# Patient Record
Sex: MALE | Race: WHITE
[De-identification: names, ages, dates, MRNs, and addresses within clinical notes are randomized per-mention and may not be internally consistent; named-entity substitution may affect disease eponyms.]

---

## 2017-08-24 ENCOUNTER — HOSPITAL ENCOUNTER (OUTPATIENT)
Dept: HOSPITAL 10 - SDS | Age: 32
Discharge: HOME | End: 2017-08-24
Attending: SURGERY
Payer: COMMERCIAL

## 2017-08-24 VITALS — DIASTOLIC BLOOD PRESSURE: 47 MMHG | HEART RATE: 62 BPM | SYSTOLIC BLOOD PRESSURE: 85 MMHG | RESPIRATION RATE: 13 BRPM

## 2017-08-24 VITALS — RESPIRATION RATE: 13 BRPM | SYSTOLIC BLOOD PRESSURE: 111 MMHG | HEART RATE: 62 BPM | DIASTOLIC BLOOD PRESSURE: 63 MMHG

## 2017-08-24 VITALS — RESPIRATION RATE: 16 BRPM | HEART RATE: 64 BPM | DIASTOLIC BLOOD PRESSURE: 62 MMHG | SYSTOLIC BLOOD PRESSURE: 116 MMHG

## 2017-08-24 VITALS — SYSTOLIC BLOOD PRESSURE: 109 MMHG | HEART RATE: 66 BPM | DIASTOLIC BLOOD PRESSURE: 60 MMHG | RESPIRATION RATE: 12 BRPM

## 2017-08-24 VITALS — HEART RATE: 64 BPM | RESPIRATION RATE: 13 BRPM | SYSTOLIC BLOOD PRESSURE: 101 MMHG | DIASTOLIC BLOOD PRESSURE: 47 MMHG

## 2017-08-24 VITALS — HEART RATE: 64 BPM | DIASTOLIC BLOOD PRESSURE: 54 MMHG | RESPIRATION RATE: 13 BRPM | SYSTOLIC BLOOD PRESSURE: 101 MMHG

## 2017-08-24 VITALS — HEART RATE: 78 BPM | DIASTOLIC BLOOD PRESSURE: 57 MMHG | SYSTOLIC BLOOD PRESSURE: 112 MMHG | RESPIRATION RATE: 11 BRPM

## 2017-08-24 VITALS — DIASTOLIC BLOOD PRESSURE: 63 MMHG | SYSTOLIC BLOOD PRESSURE: 120 MMHG | RESPIRATION RATE: 14 BRPM | HEART RATE: 62 BPM

## 2017-08-24 VITALS — RESPIRATION RATE: 14 BRPM | DIASTOLIC BLOOD PRESSURE: 52 MMHG | SYSTOLIC BLOOD PRESSURE: 107 MMHG | HEART RATE: 62 BPM

## 2017-08-24 VITALS — DIASTOLIC BLOOD PRESSURE: 64 MMHG | HEART RATE: 62 BPM | RESPIRATION RATE: 13 BRPM | SYSTOLIC BLOOD PRESSURE: 113 MMHG

## 2017-08-24 VITALS — DIASTOLIC BLOOD PRESSURE: 73 MMHG | RESPIRATION RATE: 14 BRPM | SYSTOLIC BLOOD PRESSURE: 106 MMHG | HEART RATE: 66 BPM

## 2017-08-24 VITALS — RESPIRATION RATE: 13 BRPM | DIASTOLIC BLOOD PRESSURE: 52 MMHG | HEART RATE: 64 BPM | SYSTOLIC BLOOD PRESSURE: 100 MMHG

## 2017-08-24 VITALS — HEART RATE: 66 BPM | SYSTOLIC BLOOD PRESSURE: 103 MMHG | RESPIRATION RATE: 13 BRPM | DIASTOLIC BLOOD PRESSURE: 61 MMHG

## 2017-08-24 VITALS — DIASTOLIC BLOOD PRESSURE: 61 MMHG | HEART RATE: 62 BPM | RESPIRATION RATE: 13 BRPM | SYSTOLIC BLOOD PRESSURE: 119 MMHG

## 2017-08-24 VITALS
HEIGHT: 69 IN | WEIGHT: 152.12 LBS | BODY MASS INDEX: 22.53 KG/M2 | HEIGHT: 69 IN | WEIGHT: 152.12 LBS | BODY MASS INDEX: 22.53 KG/M2

## 2017-08-24 VITALS — DIASTOLIC BLOOD PRESSURE: 55 MMHG | RESPIRATION RATE: 13 BRPM | HEART RATE: 64 BPM | SYSTOLIC BLOOD PRESSURE: 101 MMHG

## 2017-08-24 VITALS — HEART RATE: 64 BPM | RESPIRATION RATE: 16 BRPM | SYSTOLIC BLOOD PRESSURE: 112 MMHG | DIASTOLIC BLOOD PRESSURE: 66 MMHG

## 2017-08-24 VITALS — DIASTOLIC BLOOD PRESSURE: 50 MMHG | RESPIRATION RATE: 13 BRPM | HEART RATE: 64 BPM | SYSTOLIC BLOOD PRESSURE: 87 MMHG

## 2017-08-24 VITALS — SYSTOLIC BLOOD PRESSURE: 109 MMHG | DIASTOLIC BLOOD PRESSURE: 60 MMHG | HEART RATE: 67 BPM | RESPIRATION RATE: 20 BRPM

## 2017-08-24 VITALS — HEART RATE: 72 BPM | DIASTOLIC BLOOD PRESSURE: 54 MMHG | RESPIRATION RATE: 13 BRPM | SYSTOLIC BLOOD PRESSURE: 99 MMHG

## 2017-08-24 VITALS — HEART RATE: 66 BPM | RESPIRATION RATE: 13 BRPM | DIASTOLIC BLOOD PRESSURE: 55 MMHG | SYSTOLIC BLOOD PRESSURE: 109 MMHG

## 2017-08-24 DIAGNOSIS — D23.4: Primary | ICD-10-CM

## 2017-08-24 PROCEDURE — 88307 TISSUE EXAM BY PATHOLOGIST: CPT

## 2017-08-24 PROCEDURE — 14020 TIS TRNFR S/A/L 10 SQ CM/<: CPT

## 2017-08-24 NOTE — OPR
Date/Time of Note


Date/Time of Note


DATE: 8/24/17 


TIME: 13:51





Operative Report


Procedure Date:  Aug 24, 2017


Preoperative Diagnosis


scalp mass


Postoperative Diagnosis


same


Operation Performed


1. scalp mass excision 2 cm mass 2 cm incision


2. localized adjacent tissue transfer with the use of skin flaps 4 sq cm defect


3. therapeutic injection of subcutaneous marcaine cpt code 73125


Surgeon:  PATRICIA VAUGHN


Anesthesia Type:  general


Estimated Blood Loss:  0 - 10 ml's


Specimens


scalp mass


Grafts/Implants:  none


Complications:  no


Indications


This is a 32 year old male with a scalp mass.  He requests surgical excision.  

Risks, alternatives, benefits, personnel were discussed with the patient.  

Patient expressed understanding and consents to the operation.


Procedure Description


Patient is taken to the or and prepped and draped in the usual sterile fashion.

  Surgical timeout is performed.  IV antibiotics are given.  Elliptical 

incision is made surrounding the scalp mass.  Dissection cauterie is used to 

excise the mass leaving a 2 x 2 cm defect.  Localized adjacent tissue transfer 

with the use of skin flaps is used to close the wound.  Multilayer closure with 

3-0 vicryl and 4-0 monocryl is performed.  Therapeutic subcutaneous marcaine is 

injection throughout the incision.  antibiotic ointment is applied.











PATRICIA VAUGHN Aug 24, 2017 13:55

## 2018-08-09 ENCOUNTER — HOSPITAL ENCOUNTER (EMERGENCY)
Age: 33
LOS: 1 days | Discharge: LEFT BEFORE DISCHARGE | End: 2018-08-10

## 2018-08-09 ENCOUNTER — HOSPITAL ENCOUNTER (EMERGENCY)
Dept: HOSPITAL 91 - FTE | Age: 33
LOS: 1 days | Discharge: LEFT BEFORE DISCHARGE | End: 2018-08-10
Payer: COMMERCIAL

## 2018-08-09 DIAGNOSIS — Z53.21: Primary | ICD-10-CM

## 2018-08-11 ENCOUNTER — HOSPITAL ENCOUNTER (EMERGENCY)
Age: 33
Discharge: HOME | End: 2018-08-11

## 2018-08-11 ENCOUNTER — HOSPITAL ENCOUNTER (EMERGENCY)
Dept: HOSPITAL 91 - FTE | Age: 33
Discharge: HOME | End: 2018-08-11
Payer: COMMERCIAL

## 2018-08-11 DIAGNOSIS — T36.0X5A: ICD-10-CM

## 2018-08-11 DIAGNOSIS — R19.7: Primary | ICD-10-CM

## 2018-08-11 LAB
ABNORMAL IP MESSAGE: 1
ADD MAN DIFF?: YES
ADD UMIC: YES
ALANINE AMINOTRANSFERASE: 77 IU/L (ref 13–69)
ALBUMIN/GLOBULIN RATIO: 1.2
ALBUMIN: 3.6 G/DL (ref 3.3–4.9)
ALKALINE PHOSPHATASE: 56 IU/L (ref 42–121)
ANION GAP: 15 (ref 8–16)
ASPARTATE AMINO TRANSFERASE: 104 IU/L (ref 15–46)
BAND NEUTROPHILS #M: 0.8 10^3/UL (ref 0–0.6)
BAND NEUTROPHILS % (M): 31 % (ref 0–4)
BILIRUBIN,DIRECT: 0 MG/DL (ref 0–0.2)
BILIRUBIN,TOTAL: 0.1 MG/DL (ref 0.2–1.3)
BLOOD UREA NITROGEN: 16 MG/DL (ref 7–20)
CALCIUM: 8.3 MG/DL (ref 8.4–10.2)
CARBON DIOXIDE: 28 MMOL/L (ref 21–31)
CHLORIDE: 94 MMOL/L (ref 97–110)
CREATININE: 0.86 MG/DL (ref 0.61–1.24)
GIANT THROMBO% (M): 1 % (ref 0–0)
GLOBULIN: 3 G/DL (ref 1.3–3.2)
GLUCOSE: 126 MG/DL (ref 70–220)
HEMATOCRIT: 40 % (ref 42–52)
HEMOGLOBIN: 14.4 G/DL (ref 14–18)
IMMATURE GRANS #M: 0.02 10^3/UL
IMMATURE GRANS % (M): 0.8 %
LIPASE: 311 U/L (ref 23–300)
LYMPHOCYTES #M: 0.9 10^3/UL (ref 0.8–2.9)
LYMPHOCYTES % (M): 35 % (ref 15–51)
MEAN CORPUSCULAR HEMOGLOBIN: 31.7 PG (ref 29–33)
MEAN CORPUSCULAR HGB CONC: 36 G/DL (ref 32–37)
MEAN CORPUSCULAR VOLUME: 88.1 FL (ref 82–101)
MEAN PLATELET VOLUME: 10.3 FL (ref 7.4–10.4)
MONOCYTE #M: 0.1 10^3/UL (ref 0.3–0.9)
MONOCYTES % (M): 4 % (ref 0–11)
NUCLEATED RED BLOOD CELLS%: 0 /100WBC (ref 0–0)
PLATELET COUNT: 77 10^3/UL (ref 140–415)
PLATELET ESTIMATE: (no result)
POSITIVE DIFF: (no result)
POTASSIUM: 3.5 MMOL/L (ref 3.5–5.1)
REACTIVE LYMPHOCYTES #M: 0 10^3/UL (ref 0–0)
REACTIVE LYMPHOCYTES% (M): 2 % (ref 0–0)
RED BLOOD COUNT: 4.54 10^6/UL (ref 4.7–6.1)
RED CELL DISTRIBUTION WIDTH: 12.9 % (ref 11.5–14.5)
SEG NEUT #M: 0.7 10^3/UL (ref 1.6–7.5)
SEGMENTED NEUTROPHILS (M) %: 28 % (ref 39–77)
SMUDGE%M: 16 % (ref 0–0)
SODIUM: 133 MMOL/L (ref 135–144)
TOTAL PROTEIN: 6.6 G/DL (ref 6.1–8.1)
UR ASCORBIC ACID: 20 MG/DL
UR BILIRUBIN (DIP): NEGATIVE MG/DL
UR BLOOD (DIP): NEGATIVE MG/DL
UR CLARITY: CLEAR
UR COLOR: YELLOW
UR GLUCOSE (DIP): NEGATIVE MG/DL
UR KETONES (DIP): (no result) MG/DL
UR LEUKOCYTE ESTERASE (DIP): NEGATIVE LEU/UL
UR MUCUS: (no result) /HPF
UR NITRITE (DIP): NEGATIVE MG/DL
UR PH (DIP): 6 (ref 5–9)
UR RBC: 1 /HPF (ref 0–5)
UR SPECIFIC GRAVITY (DIP): 1.02 (ref 1–1.03)
UR TOTAL PROTEIN (DIP): (no result) MG/DL
UR UROBILINOGEN (DIP): (no result) MG/DL
UR WBC: 12 /HPF (ref 0–5)
WHITE BLOOD COUNT: 2.6 10^3/UL (ref 4.8–10.8)

## 2018-08-11 PROCEDURE — 96361 HYDRATE IV INFUSION ADD-ON: CPT

## 2018-08-11 PROCEDURE — 81001 URINALYSIS AUTO W/SCOPE: CPT

## 2018-08-11 PROCEDURE — 80053 COMPREHEN METABOLIC PANEL: CPT

## 2018-08-11 PROCEDURE — 99284 EMERGENCY DEPT VISIT MOD MDM: CPT

## 2018-08-11 PROCEDURE — 36415 COLL VENOUS BLD VENIPUNCTURE: CPT

## 2018-08-11 PROCEDURE — 96375 TX/PRO/DX INJ NEW DRUG ADDON: CPT

## 2018-08-11 PROCEDURE — 83690 ASSAY OF LIPASE: CPT

## 2018-08-11 PROCEDURE — 96374 THER/PROPH/DIAG INJ IV PUSH: CPT

## 2018-08-11 PROCEDURE — 85025 COMPLETE CBC W/AUTO DIFF WBC: CPT

## 2018-08-11 RX ADMIN — KETOROLAC TROMETHAMINE 1 MG: 15 INJECTION, SOLUTION INTRAMUSCULAR; INTRAVENOUS at 05:39

## 2018-08-11 RX ADMIN — THIAMINE HYDROCHLORIDE 1 MLS/HR: 100 INJECTION, SOLUTION INTRAMUSCULAR; INTRAVENOUS at 05:35

## 2018-08-11 RX ADMIN — THIAMINE HYDROCHLORIDE 1 MLS/HR: 100 INJECTION, SOLUTION INTRAMUSCULAR; INTRAVENOUS at 06:35

## 2018-08-11 RX ADMIN — FAMOTIDINE 1 MG: 20 TABLET ORAL at 05:38

## 2018-08-11 RX ADMIN — DIPHENOXYLATE HYDROCHLORIDE AND ATROPINE SULFATE 1 TAB: 2.5; .025 TABLET ORAL at 05:46

## 2018-08-11 RX ADMIN — ONDANSETRON HYDROCHLORIDE 1 MG: 2 INJECTION, SOLUTION INTRAMUSCULAR; INTRAVENOUS at 05:38

## 2019-04-23 ENCOUNTER — HOSPITAL ENCOUNTER (EMERGENCY)
Dept: HOSPITAL 91 - FTE | Age: 34
Discharge: HOME | End: 2019-04-23
Payer: COMMERCIAL

## 2019-04-23 ENCOUNTER — HOSPITAL ENCOUNTER (EMERGENCY)
Dept: HOSPITAL 10 - FTE | Age: 34
Discharge: HOME | End: 2019-04-23
Payer: COMMERCIAL

## 2019-04-23 VITALS — HEART RATE: 59 BPM | RESPIRATION RATE: 16 BRPM | DIASTOLIC BLOOD PRESSURE: 75 MMHG | SYSTOLIC BLOOD PRESSURE: 125 MMHG

## 2019-04-23 VITALS
HEIGHT: 69 IN | BODY MASS INDEX: 22.2 KG/M2 | BODY MASS INDEX: 22.2 KG/M2 | WEIGHT: 149.91 LBS | WEIGHT: 149.91 LBS | HEIGHT: 69 IN

## 2019-04-23 DIAGNOSIS — F17.210: ICD-10-CM

## 2019-04-23 DIAGNOSIS — R07.9: Primary | ICD-10-CM

## 2019-04-23 DIAGNOSIS — F14.10: ICD-10-CM

## 2019-04-23 LAB
ADD MAN DIFF?: NO
ANION GAP: 9 (ref 5–13)
BASOPHIL #: 0 10^3/UL (ref 0–0.1)
BASOPHILS %: 0.5 % (ref 0–2)
BLOOD UREA NITROGEN: 15 MG/DL (ref 7–20)
CALCIUM: 9.8 MG/DL (ref 8.4–10.2)
CARBON DIOXIDE: 31 MMOL/L (ref 21–31)
CHLORIDE: 100 MMOL/L (ref 97–110)
CK INDEX: 0.7
CK-MB: 0.41 NG/ML (ref 0–2.4)
CREATINE KINASE: 62 IU/L (ref 23–200)
CREATININE: 0.9 MG/DL (ref 0.61–1.24)
EOSINOPHILS #: 0.2 10^3/UL (ref 0–0.5)
EOSINOPHILS %: 1.8 % (ref 0–7)
GLUCOSE: 99 MG/DL (ref 70–220)
HEMATOCRIT: 45.2 % (ref 42–52)
HEMOGLOBIN: 15.3 G/DL (ref 14–18)
IMMATURE GRANS #M: 0.02 10^3/UL (ref 0–0.03)
IMMATURE GRANS % (M): 0.2 % (ref 0–0.43)
LYMPHOCYTES #: 2.5 10^3/UL (ref 0.8–2.9)
LYMPHOCYTES %: 29.7 % (ref 15–51)
MEAN CORPUSCULAR HEMOGLOBIN: 32 PG (ref 29–33)
MEAN CORPUSCULAR HGB CONC: 33.8 G/DL (ref 32–37)
MEAN CORPUSCULAR VOLUME: 94.6 FL (ref 82–101)
MEAN PLATELET VOLUME: 9 FL (ref 7.4–10.4)
MONOCYTE #: 0.7 10^3/UL (ref 0.3–0.9)
MONOCYTES %: 7.9 % (ref 0–11)
NEUTROPHIL #: 5 10^3/UL (ref 1.6–7.5)
NEUTROPHILS %: 59.9 % (ref 39–77)
NUCLEATED RED BLOOD CELLS #: 0 10^3/UL (ref 0–0)
NUCLEATED RED BLOOD CELLS%: 0 /100WBC (ref 0–0)
PLATELET COUNT: 205 10^3/UL (ref 140–415)
POTASSIUM: 4.1 MMOL/L (ref 3.5–5.1)
RED BLOOD COUNT: 4.78 10^6/UL (ref 4.7–6.1)
RED CELL DISTRIBUTION WIDTH: 13.5 % (ref 11.5–14.5)
SODIUM: 140 MMOL/L (ref 135–144)
TROPONIN-I: < 0.012 NG/ML (ref 0–0.12)
WHITE BLOOD COUNT: 8.4 10^3/UL (ref 4.8–10.8)

## 2019-04-23 PROCEDURE — 82553 CREATINE MB FRACTION: CPT

## 2019-04-23 PROCEDURE — 71045 X-RAY EXAM CHEST 1 VIEW: CPT

## 2019-04-23 PROCEDURE — 99285 EMERGENCY DEPT VISIT HI MDM: CPT

## 2019-04-23 PROCEDURE — 84484 ASSAY OF TROPONIN QUANT: CPT

## 2019-04-23 PROCEDURE — 80307 DRUG TEST PRSMV CHEM ANLYZR: CPT

## 2019-04-23 PROCEDURE — 80048 BASIC METABOLIC PNL TOTAL CA: CPT

## 2019-04-23 PROCEDURE — 93005 ELECTROCARDIOGRAM TRACING: CPT

## 2019-04-23 PROCEDURE — 85025 COMPLETE CBC W/AUTO DIFF WBC: CPT

## 2019-04-23 PROCEDURE — 82550 ASSAY OF CK (CPK): CPT

## 2019-04-23 PROCEDURE — 36415 COLL VENOUS BLD VENIPUNCTURE: CPT

## 2019-04-23 RX ADMIN — ASPIRIN 325 MG ORAL TABLET 1 MG: 325 PILL ORAL at 15:09

## 2019-04-23 NOTE — ERD
ER Documentation


Chief Complaint


Chief Complaint





pt is bib self with c/o chest pain starting last night





HPI


History of Present Illness: 34-year-old male with no past medical history coming


in today with complaint of left-sided chest pain that started last night.  


Patient reports pain as consistent, nonprogressive, tightness, discomfort, 


heaviness.  Patient reports that upon awakening this morning pain was 


persistent.  Patient with history of acid reflux but reports that his acid 


reflux usually did not does not present like the symptoms.  Patient rating pain 


6 of 10.  Patient admits to smoking marijuana and cigarettes often.  In alcohol 


socially.  Patient denies cocaine or other illicit drug use.





At home pharmacological/nonpharmacological treatment for symptoms: Denies





Denies social concerns; Denies recent foreign travel





ROS


All systems reviewed and are negative except as per history of present illness.





Medications


Home Meds


Active Scripts


Ondansetron Hcl* (Zofran*) 4 Mg Tablet, 4 MG PO Q6H for NAUSEA AND/OR VOMITING, 


#30 TAB


   Prov:KAILYN ALONSO PA-C         8/11/18


Loperamide Hcl* (Imodium*) 2 Mg Capsule, 2 MG PO .AFTER EA LOOSE BM PRN for 


DIARRHEA, #10 TAB


   Prov:CALLY,KATIE         8/11/18


Acetaminophen* (Tylophen*) 500 Mg Capsule, 1 CAP PO Q6H PRN for PAIN AND OR 


ELEVATED TEMP, #20 CAP


   Prov:CALLY,KATIE         8/11/18


Azithromycin* (Zithromax*) 250 Mg Tablet, 250 MG PO .ZPACK AS DIRECTED, #6 TAB


   TAKE 500 MG (2 TABS) THE FIRST DAY THEN 250 MG (1 TAB) DAYS 2-5


   Prov:CALLY,KATIE         8/11/18





Allergies


Allergies:  


Coded Allergies:  


     No Known Allergies (Verified  Allergy, Unknown, 8/23/17)





PMhx/Soc


History of Surgery:  Yes (HEAD)


Anesthesia Reaction:  No


Hx Neurological Disorder:  No


Hx Respiratory Disorders:  No


Hx Cardiac Disorders:  No


Hx Psychiatric Problems:  No


Hx Miscellaneous Medical Probl:  Yes (SCALP MASS)


Hx Alcohol Use:  Yes (REPORTS OCCASIONAL USE)


Hx Substance Use:  Yes (REPORTS OCCASIONAL USE)


Hx Tobacco Use:  Yes (REPORTS OCCASIONAL USE)


Smoking Status:  Current some day smoker





FmHx


Family History:  No diabetes, No coronary disease





Physical Exam


Vitals





Physical Exam


Const:   No acute distress


Head:   Atraumatic 


Eyes:    Normal Conjunctiva


ENT:    Normal External Ears, Nose and Mouth.


Neck:               Full range of motion. No meningismus.


Resp:   Clear to auscultation bilaterally


Cardio:   Regular rate and rhythm, no murmurs


Abd:    Soft, non tender, non distended. Normal bowel sounds


Skin:   No petechiae or rashes


Back:   No midline or flank tenderness


Ext:    No cyanosis, or edema


Neur:   Awake and alert


Psych:    Normal Mood and Affect


Results 24 hrs





Laboratory Tests


              Test
                                 4/23/19
15:10


              White Blood Count                      8.4 10^3/ul


              Red Blood Count                       4.78 10^6/ul


              Hemoglobin                               15.3 g/dl


              Hematocrit                                  45.2 %


              Mean Corpuscular Volume                    94.6 fl


              Mean Corpuscular Hemoglobin                32.0 pg


              Mean Corpuscular Hemoglobin
Concent     33.8 g/dl 



              Red Cell Distribution Width                 13.5 %


              Platelet Count                         205 10^3/UL


              Mean Platelet Volume                        9.0 fl


              Immature Granulocytes %                    0.200 %


              Neutrophils %                               59.9 %


              Lymphocytes %                               29.7 %


              Monocytes %                                  7.9 %


              Eosinophils %                                1.8 %


              Basophils %                                  0.5 %


              Nucleated Red Blood Cells %            0.0 /100WBC


              Immature Granulocytes #              0.020 10^3/ul


              Neutrophils #                          5.0 10^3/ul


              Lymphocytes #                          2.5 10^3/ul


              Monocytes #                            0.7 10^3/ul


              Eosinophils #                          0.2 10^3/ul


              Basophils #                            0.0 10^3/ul


              Nucleated Red Blood Cells #            0.0 10^3/ul


              Sodium Level                            140 mmol/L


              Potassium Level                         4.1 mmol/L


              Chloride Level                          100 mmol/L


              Carbon Dioxide Level                     31 mmol/L


              Anion Gap                                        9


              Blood Urea Nitrogen                       15 mg/dl


              Creatinine                              0.90 mg/dl


              Est Glomerular Filtrat Rate
mL/min   > 60 mL/min 



              Glucose Level                             99 mg/dl


              Calcium Level                            9.8 mg/dl


              Creatine Kinase                            62 IU/L


              Creatine Kinase Index                          0.7


              Creatinine Kinase MB (Mass)             0.41 ng/ml


              Troponin I                           < 0.012 ng/ml


              Urine Opiates Screen                 Negative


              Urine Barbiturates                   Negative


              Urine Amphetamines Screen            Negative


              Urine Benzodiazepines Screen         Negative


              Urine Cocaine Screen                 Positive


              Urine Cannabinoids                   Positive





Current Medications


 Medications
   Dose
          Sig/Chidi
       Start Time
   Status  Last


 (Trade)       Ordered        Route
 PRN     Stop Time              Admin
Dose


                              Reason                                Admin


 Aspirin
       325 mg         ONCE  STAT
    4/23/19       DC           4/23/19


(Aspirin)                     PO
            15:00
                       15:09



                                             4/23/19 15:03








Procedures/MDM


ED course includes a thorough examination and history. 


Medications: Aspirin


Imaging: Chest x-ray, EKG


Labs: CBC, CMP, troponin, CK-MB, CK, UDS 





Low suspicion for life-threatening medical emergency.  Low suspicion for STEMI, 


NSTEMI, pneumonia, arrhythmia.  Low suspicion for cardiac emergency that 


requires hospitalization or immediate surgical intervention at this time.





Otherwise healthy patient presenting with constellation of symptoms likely 


representing uncomplicated chest pain secondary to cocaine use as characterized 


by history, physical exam findings,, lab findings, imaging findings.  CBC:      


no e/o of systemic infection or severe anemia.  CMP:      no e/o severe 


acidosis, alkalosis, renal failure, diabetic ketoacidosis, liver disease.  


Troponin, CK, CK-MB negative.  Urinalysis positive for marijuana and cocaine.  


Chest x-ray report without any acute abnormalities.





EKG @1355: 


Rate/Rhythm:       Normal Sinus Rhythm


QRS, ST, T-waves:    No changes consistent w/ acute ischemia


Impression:      No evidence of ischemia or arrhythmia





No respiratory distress, otherwise relatively well appearing and nontoxic.  


Patient with decreased pain after aspirin use.  Patient without any cardiac 


arrhythmias during ER visit.  Patient educated on stopping cocaine use.  Patient


educated on diagnoses, prescriptions, follow-up care, return precautions.  


Strict return precautions given for worsening condition; questions answered 


discharge.





Disposition for discharge with followup in 2 days with PCP/clinic.





Departure


Diagnosis:  


   Primary Impression:  


   Chest pain


   Chest pain type:  unspecified  Qualified Codes:  R07.9 - Chest pain, 


   unspecified


   Additional Impression:  


   Cocaine abuse


Condition:  Stable


Patient Instructions:  Cocaine: Understanding Its Effects, Chest Pain, Uncertain


Cause, Drug Abuse


Referrals:  


formerly [facility]


YOU HAVE RECEIVED A MEDICAL SCREENING EXAM AND THE RESULTS INDICATE THAT YOU DO 


NOT HAVE A CONDITION THAT REQUIRES URGENT TREATMENT IN THE EMERGENCY DEPARTMENT.





FURTHER EVALUATION AND TREATMENT OF YOUR CONDITION CAN WAIT UNTIL YOU ARE SEEN 


IN YOUR DOCTORS OFFICE WITHIN THE NEXT 1-2 DAYS. IT IS YOUR RESPONSIBILITY TO 


MAKE AN APPOINTMENT FOR FOLOW-UP CARE.





IF YOU HAVE A PRIMARY DOCTOR


--you should call your primary doctor and schedule an appointment





IF YOU DO NOT HAVE A PRIMARY DOCTOR YOU CAN CALL OUR PHYSICIAN REFERRAL HOTLINE 


AT


 [phone] 





IF YOU CAN NOT AFFORD TO SEE A PHYSICIAN YOU CAN CHOSE FROM THE FOLLOWING [facility][1] [phone] [address] [place]





[facility] [phone] [address] [facility]. [facility]





[facility] [phone] [address] [facility] [phone] [address] [facility]. [facility] [phone] [address] [facility]. [facility]. [phone] [address] [facility]. [facility]


YOU HAVE RECEIVED A MEDICAL SCREENING EXAM AND THE RESULTS INDICATE THAT YOU DO 


NOT HAVE A CONDITION THAT REQUIRES URGENT TREATMENT IN THE EMERGENCY DEPARTMENT.





FURTHER EVALUATION AND TREATMENT OF YOUR CONDITION CAN WAIT UNTIL YOU ARE SEEN 


IN YOUR DOCTORS OFFICE WITHIN THE NEXT 1-2 DAYS. IT IS YOUR RESPONSIBILITY TO 


MAKE AN APPOINTMENT FOR FOLOW-UP CARE.





IF YOU HAVE A PRIMARY DOCTOR


--you should call your primary doctor and schedule and appointment





IF YOU DO NOT HAVE A PRIMARY DOCTOR YOU CAN CALL OUR PHYSICIAN REFERRAL HOTLINE 


AT [phone].





IF YOU CAN NOT AFFORD TO SEE A PHYSICIAN YOU CAN CHOSE FROM THE FOLLOWING [facility]


INSTITUTIONS:





[facility]


[address] [place] [address]





[facility]


[address] [place] [address]





[facility]


[address] [place] [address]





Additional Instructions:  


Thank you very much for allowing us to participate in your care. 


Your health and safety is our top priority at [facility].  It 


is important to read all discharge instructions and education provided in your 


discharge packet.





*Stop cocaine use.  This can cause a life threatening heart attack*





All blood tests were normal during ER visit including cardiac enzymes.





Call your primary care doctor TOMORROW for an appointment during the next 2-4 


days and bring all the information. 





If the symptoms get worse and your provider is unavailable, return to the 


Emergency Department immediately.











HODAN HARRIS NP            Apr 23, 2019 18:26

## 2023-08-25 ENCOUNTER — APPOINTMENT (OUTPATIENT)
Dept: URBAN - METROPOLITAN AREA CLINIC 233 | Age: 38
Setting detail: DERMATOLOGY
End: 2023-08-25

## 2023-08-25 DIAGNOSIS — L81.4 OTHER MELANIN HYPERPIGMENTATION: ICD-10-CM

## 2023-08-25 DIAGNOSIS — L70.0 ACNE VULGARIS: ICD-10-CM

## 2023-08-25 DIAGNOSIS — D22 MELANOCYTIC NEVI: ICD-10-CM

## 2023-08-25 DIAGNOSIS — L81.8 OTHER SPECIFIED DISORDERS OF PIGMENTATION: ICD-10-CM

## 2023-08-25 PROBLEM — D22.5 MELANOCYTIC NEVI OF TRUNK: Status: ACTIVE | Noted: 2023-08-25

## 2023-08-25 PROCEDURE — OTHER BIOPSY BY SHAVE METHOD: OTHER

## 2023-08-25 PROCEDURE — OTHER COUNSELING: OTHER

## 2023-08-25 PROCEDURE — 10040 ACNE SURGERY: CPT

## 2023-08-25 PROCEDURE — OTHER PRESCRIPTION: OTHER

## 2023-08-25 PROCEDURE — OTHER ACNE SURGERY: OTHER

## 2023-08-25 PROCEDURE — 99203 OFFICE O/P NEW LOW 30 MIN: CPT | Mod: 25

## 2023-08-25 PROCEDURE — OTHER MIPS QUALITY: OTHER

## 2023-08-25 PROCEDURE — 11102 TANGNTL BX SKIN SINGLE LES: CPT

## 2023-08-25 RX ORDER — TRETIONIN 1 MG/G
CREAM TOPICAL
Qty: 20 | Refills: 11 | Status: ERX | COMMUNITY
Start: 2023-08-25

## 2023-08-25 ASSESSMENT — LOCATION ZONE DERM
LOCATION ZONE: LIP
LOCATION ZONE: FACE
LOCATION ZONE: TRUNK

## 2023-08-25 ASSESSMENT — LOCATION SIMPLE DESCRIPTION DERM
LOCATION SIMPLE: RIGHT UPPER BACK
LOCATION SIMPLE: LEFT LIP
LOCATION SIMPLE: LEFT CHEEK
LOCATION SIMPLE: RIGHT CHEEK

## 2023-08-25 ASSESSMENT — LOCATION DETAILED DESCRIPTION DERM
LOCATION DETAILED: RIGHT CENTRAL MALAR CHEEK
LOCATION DETAILED: RIGHT SUPERIOR UPPER BACK
LOCATION DETAILED: LEFT LOWER CUTANEOUS LIP
LOCATION DETAILED: LEFT INFERIOR CENTRAL MALAR CHEEK

## 2023-08-25 NOTE — PROCEDURE: BIOPSY BY SHAVE METHOD

## 2023-08-25 NOTE — PROCEDURE: COUNSELING
Aklief Pregnancy And Lactation Text: It is unknown if this medication is safe to use during pregnancy.  It is unknown if this medication is excreted in breast milk.  Breastfeeding women should use the topical cream on the smallest area of the skin for the shortest time needed while breastfeeding.  Do not apply to nipple and areola.
Tazorac Pregnancy And Lactation Text: This medication is not safe during pregnancy. It is unknown if this medication is excreted in breast milk.
Sarecycline Pregnancy And Lactation Text: This medication is Pregnancy Category D and not consider safe during pregnancy. It is also excreted in breast milk.
Azelaic Acid Pregnancy And Lactation Text: This medication is considered safe during pregnancy and breast feeding.
Benzoyl Peroxide Pregnancy And Lactation Text: This medication is Pregnancy Category C. It is unknown if benzoyl peroxide is excreted in breast milk.
Topical Clindamycin Pregnancy And Lactation Text: This medication is Pregnancy Category B and is considered safe during pregnancy. It is unknown if it is excreted in breast milk.
Doxycycline Pregnancy And Lactation Text: This medication is Pregnancy Category D and not consider safe during pregnancy. It is also excreted in breast milk but is considered safe for shorter treatment courses.
Azithromycin Pregnancy And Lactation Text: This medication is considered safe during pregnancy and is also secreted in breast milk.
Spironolactone Pregnancy And Lactation Text: This medication can cause feminization of the male fetus and should be avoided during pregnancy. The active metabolite is also found in breast milk.
High Dose Vitamin A Pregnancy And Lactation Text: High dose vitamin A therapy is contraindicated during pregnancy and breast feeding.
Include Pregnancy/Lactation Warning?: No
Topical Sulfur Applications Pregnancy And Lactation Text: This medication is Pregnancy Category C and has an unknown safety profile during pregnancy. It is unknown if this topical medication is excreted in breast milk.
Topical Retinoid Pregnancy And Lactation Text: This medication is Pregnancy Category C. It is unknown if this medication is excreted in breast milk.
Winlevi Pregnancy And Lactation Text: This medication is considered safe during pregnancy and breastfeeding.
Detail Level: Detailed
Birth Control Pills Pregnancy And Lactation Text: This medication should be avoided if pregnant and for the first 30 days post-partum.
Erythromycin Pregnancy And Lactation Text: This medication is Pregnancy Category B and is considered safe during pregnancy. It is also excreted in breast milk.
Bactrim Pregnancy And Lactation Text: This medication is Pregnancy Category D and is known to cause fetal risk.  It is also excreted in breast milk.
Isotretinoin Pregnancy And Lactation Text: This medication is Pregnancy Category X and is considered extremely dangerous during pregnancy. It is unknown if it is excreted in breast milk.
Dapsone Pregnancy And Lactation Text: This medication is Pregnancy Category C and is not considered safe during pregnancy or breast feeding.
Topical Clindamycin Counseling: Patient counseled that this medication may cause skin irritation or allergic reactions.  In the event of skin irritation, the patient was advised to reduce the amount of the drug applied or use it less frequently.   The patient verbalized understanding of the proper use and possible adverse effects of clindamycin.  All of the patient's questions and concerns were addressed.
Azelaic Acid Counseling: Patient counseled that medicine may cause skin irritation and to avoid applying near the eyes.  In the event of skin irritation, the patient was advised to reduce the amount of the drug applied or use it less frequently.   The patient verbalized understanding of the proper use and possible adverse effects of azelaic acid.  All of the patient's questions and concerns were addressed.
Spironolactone Counseling: Patient advised regarding risks of diarrhea, abdominal pain, hyperkalemia, birth defects (for female patients), liver toxicity and renal toxicity. The patient may need blood work to monitor liver and kidney function and potassium levels while on therapy. The patient verbalized understanding of the proper use and possible adverse effects of spironolactone.  All of the patient's questions and concerns were addressed.
Benzoyl Peroxide Counseling: Patient counseled that medicine may cause skin irritation and bleach clothing.  In the event of skin irritation, the patient was advised to reduce the amount of the drug applied or use it less frequently.   The patient verbalized understanding of the proper use and possible adverse effects of benzoyl peroxide.  All of the patient's questions and concerns were addressed.
Tetracycline Counseling: Patient counseled regarding possible photosensitivity and increased risk for sunburn.  Patient instructed to avoid sunlight, if possible.  When exposed to sunlight, patients should wear protective clothing, sunglasses, and sunscreen.  The patient was instructed to call the office immediately if the following severe adverse effects occur:  hearing changes, easy bruising/bleeding, severe headache, or vision changes.  The patient verbalized understanding of the proper use and possible adverse effects of tetracycline.  All of the patient's questions and concerns were addressed. Patient understands to avoid pregnancy while on therapy due to potential birth defects.
High Dose Vitamin A Counseling: Side effects reviewed, pt to contact office should one occur.
Minocycline Counseling: Patient advised regarding possible photosensitivity and discoloration of the teeth, skin, lips, tongue and gums.  Patient instructed to avoid sunlight, if possible.  When exposed to sunlight, patients should wear protective clothing, sunglasses, and sunscreen.  The patient was instructed to call the office immediately if the following severe adverse effects occur:  hearing changes, easy bruising/bleeding, severe headache, or vision changes.  The patient verbalized understanding of the proper use and possible adverse effects of minocycline.  All of the patient's questions and concerns were addressed.
Aklief counseling:  Patient advised to apply a pea-sized amount only at bedtime and wait 30 minutes after washing their face before applying.  If too drying, patient may add a non-comedogenic moisturizer.  The most commonly reported side effects including irritation, redness, scaling, dryness, stinging, burning, itching, and increased risk of sunburn.  The patient verbalized understanding of the proper use and possible adverse effects of retinoids.  All of the patient's questions and concerns were addressed.
Topical Sulfur Applications Counseling: Topical Sulfur Counseling: Patient counseled that this medication may cause skin irritation or allergic reactions.  In the event of skin irritation, the patient was advised to reduce the amount of the drug applied or use it less frequently.   The patient verbalized understanding of the proper use and possible adverse effects of topical sulfur application.  All of the patient's questions and concerns were addressed.
Tazorac Counseling:  Patient advised that medication is irritating and drying.  Patient may need to apply sparingly and wash off after an hour before eventually leaving it on overnight.  The patient verbalized understanding of the proper use and possible adverse effects of tazorac.  All of the patient's questions and concerns were addressed.
Winlevi Counseling:  I discussed with the patient the risks of topical clascoterone including but not limited to erythema, scaling, itching, and stinging. Patient voiced their understanding.
Topical Retinoid counseling:  Patient advised to apply a pea-sized amount only at bedtime and wait 30 minutes after washing their face before applying.  If too drying, patient may add a non-comedogenic moisturizer. The patient verbalized understanding of the proper use and possible adverse effects of retinoids.  All of the patient's questions and concerns were addressed.
Sarecycline Counseling: Patient advised regarding possible photosensitivity and discoloration of the teeth, skin, lips, tongue and gums.  Patient instructed to avoid sunlight, if possible.  When exposed to sunlight, patients should wear protective clothing, sunglasses, and sunscreen.  The patient was instructed to call the office immediately if the following severe adverse effects occur:  hearing changes, easy bruising/bleeding, severe headache, or vision changes.  The patient verbalized understanding of the proper use and possible adverse effects of sarecycline.  All of the patient's questions and concerns were addressed.
Dapsone Counseling: I discussed with the patient the risks of dapsone including but not limited to hemolytic anemia, agranulocytosis, rashes, methemoglobinemia, kidney failure, peripheral neuropathy, headaches, GI upset, and liver toxicity.  Patients who start dapsone require monitoring including baseline LFTs and weekly CBCs for the first month, then every month thereafter.  The patient verbalized understanding of the proper use and possible adverse effects of dapsone.  All of the patient's questions and concerns were addressed.
Birth Control Pills Counseling: Birth Control Pill Counseling: I discussed with the patient the potential side effects of OCPs including but not limited to increased risk of stroke, heart attack, thrombophlebitis, deep venous thrombosis, hepatic adenomas, breast changes, GI upset, headaches, and depression.  The patient verbalized understanding of the proper use and possible adverse effects of OCPs. All of the patient's questions and concerns were addressed.
Isotretinoin Counseling: Patient should get monthly blood tests, not donate blood, not drive at night if vision affected, not share medication, and not undergo elective surgery for 6 months after tx completed. Side effects reviewed, pt to contact office should one occur.
Bactrim Counseling:  I discussed with the patient the risks of sulfa antibiotics including but not limited to GI upset, allergic reaction, drug rash, diarrhea, dizziness, photosensitivity, and yeast infections.  Rarely, more serious reactions can occur including but not limited to aplastic anemia, agranulocytosis, methemoglobinemia, blood dyscrasias, liver or kidney failure, lung infiltrates or desquamative/blistering drug rashes.
Erythromycin Counseling:  I discussed with the patient the risks of erythromycin including but not limited to GI upset, allergic reaction, drug rash, diarrhea, increase in liver enzymes, and yeast infections.
Azithromycin Counseling:  I discussed with the patient the risks of azithromycin including but not limited to GI upset, allergic reaction, drug rash, diarrhea, and yeast infections.
Doxycycline Counseling:  Patient counseled regarding possible photosensitivity and increased risk for sunburn.  Patient instructed to avoid sunlight, if possible.  When exposed to sunlight, patients should wear protective clothing, sunglasses, and sunscreen.  The patient was instructed to call the office immediately if the following severe adverse effects occur:  hearing changes, easy bruising/bleeding, severe headache, or vision changes.  The patient verbalized understanding of the proper use and possible adverse effects of doxycycline.  All of the patient's questions and concerns were addressed.

## 2023-09-07 ENCOUNTER — APPOINTMENT (OUTPATIENT)
Dept: URBAN - METROPOLITAN AREA CLINIC 233 | Age: 38
Setting detail: DERMATOLOGY
End: 2023-09-07

## 2023-09-07 DIAGNOSIS — Z41.9 ENCOUNTER FOR PROCEDURE FOR PURPOSES OTHER THAN REMEDYING HEALTH STATE, UNSPECIFIED: ICD-10-CM

## 2023-09-07 PROCEDURE — OTHER CHEMICAL PEEL GLYCOLIC ACID: OTHER

## 2023-09-07 ASSESSMENT — LOCATION SIMPLE DESCRIPTION DERM
LOCATION SIMPLE: RIGHT CHEEK
LOCATION SIMPLE: LEFT FOREHEAD
LOCATION SIMPLE: CHIN
LOCATION SIMPLE: LEFT CHEEK

## 2023-09-07 ASSESSMENT — LOCATION DETAILED DESCRIPTION DERM
LOCATION DETAILED: LEFT CHIN
LOCATION DETAILED: RIGHT CENTRAL MALAR CHEEK
LOCATION DETAILED: LEFT MEDIAL FOREHEAD
LOCATION DETAILED: LEFT CENTRAL MALAR CHEEK

## 2023-09-07 ASSESSMENT — LOCATION ZONE DERM: LOCATION ZONE: FACE

## 2023-09-07 NOTE — PROCEDURE: CHEMICAL PEEL GLYCOLIC ACID
Prep: The treated area was degreased with pre-peel cleanser, and vaseline was applied for protection of mucous membranes.
Post Peel Care: The procedure, the treatment area was washed with soap and water, and a post-peel cream was applied. Sun protection and post-care instructions were reviewed with the patient.
Time (Mins): 3
Number Of Passes (Layers): 2
Glycolic Acid %: 30%
Consent: Prior to the procedure, written consent was obtained and risks were reviewed, including but not limited to: redness, peeling, blistering, pigmentary change, scarring, infection, and pain.
Post-Care Instructions: I reviewed with the patient in detail post-care instructions. Patient should avoid sun exposure and wear sun protection.
Treatment Number: 0
Detail Level: Zone

## 2023-09-21 ENCOUNTER — APPOINTMENT (OUTPATIENT)
Dept: URBAN - METROPOLITAN AREA CLINIC 233 | Age: 38
Setting detail: DERMATOLOGY
End: 2023-09-21

## 2023-09-21 DIAGNOSIS — Z41.9 ENCOUNTER FOR PROCEDURE FOR PURPOSES OTHER THAN REMEDYING HEALTH STATE, UNSPECIFIED: ICD-10-CM

## 2023-09-21 PROCEDURE — OTHER HYDRAFACIAL: OTHER

## 2023-09-21 ASSESSMENT — LOCATION SIMPLE DESCRIPTION DERM
LOCATION SIMPLE: LEFT CHEEK
LOCATION SIMPLE: RIGHT CHEEK
LOCATION SIMPLE: SUPERIOR FOREHEAD
LOCATION SIMPLE: CHIN
LOCATION SIMPLE: NECK
LOCATION SIMPLE: SUBMENTAL CHIN

## 2023-09-21 ASSESSMENT — LOCATION DETAILED DESCRIPTION DERM
LOCATION DETAILED: LEFT INFERIOR CENTRAL MALAR CHEEK
LOCATION DETAILED: RIGHT CHIN
LOCATION DETAILED: RIGHT CENTRAL ANTERIOR NECK
LOCATION DETAILED: SUBMENTAL CHIN
LOCATION DETAILED: RIGHT INFERIOR CENTRAL MALAR CHEEK
LOCATION DETAILED: LEFT CENTRAL ANTERIOR NECK
LOCATION DETAILED: SUPERIOR MID FOREHEAD

## 2023-09-21 ASSESSMENT — LOCATION ZONE DERM
LOCATION ZONE: FACE
LOCATION ZONE: NECK

## 2023-09-21 NOTE — PROCEDURE: HYDRAFACIAL
Number Of Passes: 1
Consent: Written consent obtained, risks reviewed including but not limited to crusting, scabbing, blistering, scarring, darker or lighter pigmentary change, bruising, and/or incomplete response.
Vacuum Pressure High Setting (Will Not Render If Set To 0): 0
Tip Override
Post-Care Instructions: I reviewed with the patient in detail post-care instructions. Patient should stay away from the sun and wear sun protection until treated areas are fully healed.
Solution: GlySal 7.5%
Tip: Hydropeel Tip, Teal
Solution Override
Vacuum Pressure High Setting (Will Not Render If Set To 0): 18
Tip: Hydropeel Tip, Blue
Procedure: Fusion
Procedure: Extraction
Tip: Hydropeel Tip, Clear
Procedure: Exfoliation
Indication: anti-aging
Vacuum Pressure Low Setting (Will Not Render If Set To 0): 12
Number Of Passes: 2
Solution Override: Antiox
Solution: Beta-HD
Solution: Activ-4
Procedure: Extend and Protect
Location: face
Procedure: Peel
Procedure: Boost

## 2024-09-09 ENCOUNTER — APPOINTMENT (OUTPATIENT)
Dept: URBAN - METROPOLITAN AREA CLINIC 233 | Age: 39
Setting detail: DERMATOLOGY
End: 2024-09-09

## 2024-09-09 DIAGNOSIS — Z41.9 ENCOUNTER FOR PROCEDURE FOR PURPOSES OTHER THAN REMEDYING HEALTH STATE, UNSPECIFIED: ICD-10-CM

## 2024-09-09 PROCEDURE — OTHER PCA SKIN PEEL: OTHER

## 2024-09-09 ASSESSMENT — LOCATION SIMPLE DESCRIPTION DERM
LOCATION SIMPLE: CHIN
LOCATION SIMPLE: RIGHT CHEEK
LOCATION SIMPLE: SUBMENTAL CHIN
LOCATION SIMPLE: LEFT CHEEK
LOCATION SIMPLE: SUPERIOR FOREHEAD

## 2024-09-09 ASSESSMENT — LOCATION DETAILED DESCRIPTION DERM
LOCATION DETAILED: RIGHT CENTRAL MALAR CHEEK
LOCATION DETAILED: RIGHT CHIN
LOCATION DETAILED: LEFT CENTRAL MALAR CHEEK
LOCATION DETAILED: SUBMENTAL CHIN
LOCATION DETAILED: SUPERIOR MID FOREHEAD

## 2024-09-09 ASSESSMENT — LOCATION ZONE DERM: LOCATION ZONE: FACE

## 2024-09-09 NOTE — PROCEDURE: PCA SKIN PEEL
Prep: The treated area was degreased with pre-peel cleanser, and vaseline was applied for protection of mucous membranes.  Peel was completed with protocol as listed above.
Detail Level: Simple
Treatment Number: 1
Post-Care Instructions: I reviewed with the patient in detail post-care instructions. Patient should avoid sun exposure and wear sun protection.
Layer(S): 4 passes of Sensi peel
Number Of Prepaid Treatments (Will Not Render If 0): 0
Post Peel Care: After the peel was completed, post-peel care discussed.
Treat (Alternative Or Additional Products Used): Ultra/Pigment correct
Consent: Prior to the procedure, written consent was obtained and risks were reviewed, including but not limited to: redness, peeling, blistering, pigmentary change, scarring, infection, and pain.